# Patient Record
Sex: MALE | Race: WHITE | HISPANIC OR LATINO | URBAN - METROPOLITAN AREA
[De-identification: names, ages, dates, MRNs, and addresses within clinical notes are randomized per-mention and may not be internally consistent; named-entity substitution may affect disease eponyms.]

---
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
## 2018-01-12 NOTE — PROGRESS NOTES
Assessment    1  Encounter for preventive health examination (V70 0) (Z00 00)    Plan  Health Maintenance    · Follow-up PRN Evaluation and Treatment  Follow-up  Status: Complete  Done:  [id number]    Discussion/Summary    Suggest going to school or getting a good trade  The patient was counseled regarding  Educational resources provided: None  Self Referrals: No      Chief Complaint  new pt here for Yearly PE      History of Present Illness  HPI: Physical, Drivers PE  Mom with patient  Graduated from  one year ago, looking for work  Review of Systems    Constitutional: No fever or chills, feels well, no tiredness, no recent weight gain or weight loss  Eyes: No complaints of eye pain, no red eyes, no discharge from eyes, no itchy eyes  ENT: no complaints of earache, no hearing loss, no nosebleeds, no nasal discharge, no sore throat, no hoarseness  Cardiovascular: No complaints of slow heart rate, no fast heart rate, no chest pain, no palpitations, no leg claudication, no lower extremity  Respiratory: No complaints of shortness of breath, no wheezing, no cough, no SOB on exertion, no orthopnea or PND  Gastrointestinal: No complaints of abdominal pain, no constipation, no nausea or vomiting, no diarrhea or bloody stools  Genitourinary: No complaints of dysuria, no incontinence, no hesitancy, no nocturia, no genital lesion, no testicular pain  Musculoskeletal: No complaints of arthralgia, no myalgias, no joint swelling or stiffness, no limb pain or swelling  Integumentary: No complaints of skin rash or skin lesions, no itching, no skin wound, no dry skin  Neurological: No compliants of headache, no confusion, no convulsions, no numbness or tingling, no dizziness or fainting, no limb weakness, no difficulty walking  Psychiatric: Is not suicidal, no sleep disturbances, no anxiety or depression, no change in personality, no emotional problems     Endocrine: No complaints of proptosis, no hot flashes, no muscle weakness, no erectile dysfunction, no deepening of the voice, no feelings of weakness  Hematologic/Lymphatic: No complaints of swollen glands, no swollen glands in the neck, does not bleed easily, no easy bruising  Active Problems    1  Headache (784 0) (R51)   2  Overweight (278 02) (E66 3)   3  Wears seat belt (V49 89) (Z78 9)    Past Medical History    · History of Otitis media, recurrent (382 9) (H66 90)   · History of Wears eyeglasses (V49 89) (Z97 3)    Family History  Mother    · Family history of hypertension (V17 49) (Z82 49)   · Denied: Family history of mental disorder   · Denied: Family history of substance abuse  Father    · Denied: Family history of mental disorder   · Denied: Family history of substance abuse  Sister    · Family history of malignant neoplasm (V16 9) (Z80 9)  Maternal Grandmother    · Family history of hypertension (V17 49) (Z82 49)  Aunt    · Family history of hypertension (V17 49) (Z82 49)    Social History    · Brushes teeth daily   · Dental care, regularly   · Lives with parents   · Never a smoker   · Never a smoker   · No tobacco/smoke exposure   · No tobacco/smoke exposure    Current Meds   1  Ibuprofen 600 MG Oral Tablet; TAKE 1 TABLET EVERY 6 HOURS WITH FOOD AS   NEEDED; Therapy: 16Apr2015 to (Evaluate:21Apr2015)  Requested for: 16Apr2015; Last   Rx:16Apr2015 Ordered    Allergies    1  No Known Drug Allergies    Vitals   Recorded: 83Hps7076 08:37AM   Temperature 97 2 F, Oral   Heart Rate 68   Systolic 520, LUE   Diastolic 72, LUE   Height 5 ft 7 25 in   Weight 191 lb    BMI Calculated 29 69   BSA Calculated 1 99   BMI Percentile 94 %   2-20 Stature Percentile 21 %   2-20 Weight Percentile 89 %   O2 Saturation 97     Physical Exam    Constitutional   General appearance: No acute distress, well appearing and well nourished  Nice young man  Eyes   Conjunctiva and lids: No swelling, erythema, or discharge      Pupils and irises: Equal, round and reactive to light  Ears, Nose, Mouth, and Throat   External inspection of ears and nose: Normal     Otoscopic examination: Tympanic membrance translucent with normal light reflex  Canals patent without erythema  Oropharynx: Normal with no erythema, edema, exudate or lesions  Pulmonary   Respiratory effort: No increased work of breathing or signs of respiratory distress  Auscultation of lungs: Clear to auscultation  Cardiovascular   Auscultation of heart: Normal rate and rhythm, normal S1 and S2, without murmurs  Examination of extremities for edema and/or varicosities: Normal     Abdomen   Abdomen: Non-tender, no masses  Lymphatic   Palpation of lymph nodes in neck: No lymphadenopathy  Musculoskeletal   Gait and station: Normal     Inspection/palpation of joints, bones, and muscles: Normal     Neurologic   Reflexes: 2+ and symmetric  Psychiatric   Orientation to person, place and time: Normal     Mood and affect: Normal        Results/Data  PHQ-2 Adult Depression Screening [id number] 08:42AM User, [facility]     Test Name Result Flag Reference   PHQ-2 Adult Depression Score 1     Over the last two weeks, how often have you been bothered by any of the following problems?   Little interest or pleasure in doing things: Several days - 1  Feeling down, depressed, or hopeless: Not at all - 0   PHQ-2 Adult Depression Screening Negative         Signatures   Electronically signed by : Zaki Green DO; Jul 31 2017  9:22AM EST                       (Author)

## 2018-01-13 VITALS
DIASTOLIC BLOOD PRESSURE: 72 MMHG | HEIGHT: 67 IN | HEART RATE: 68 BPM | BODY MASS INDEX: 29.98 KG/M2 | WEIGHT: 191 LBS | SYSTOLIC BLOOD PRESSURE: 112 MMHG | TEMPERATURE: 97.2 F | OXYGEN SATURATION: 97 %